# Patient Record
Sex: FEMALE | Race: WHITE
[De-identification: names, ages, dates, MRNs, and addresses within clinical notes are randomized per-mention and may not be internally consistent; named-entity substitution may affect disease eponyms.]

---

## 2017-09-14 ENCOUNTER — HOSPITAL ENCOUNTER (EMERGENCY)
Dept: HOSPITAL 10 - E/R | Age: 67
LOS: 1 days | Discharge: HOME | End: 2017-09-15
Payer: COMMERCIAL

## 2017-09-14 VITALS
WEIGHT: 189.6 LBS | HEIGHT: 60 IN | BODY MASS INDEX: 37.22 KG/M2 | WEIGHT: 189.6 LBS | BODY MASS INDEX: 37.22 KG/M2 | HEIGHT: 60 IN

## 2017-09-14 DIAGNOSIS — K76.0: ICD-10-CM

## 2017-09-14 DIAGNOSIS — N83.202: ICD-10-CM

## 2017-09-14 DIAGNOSIS — I10: ICD-10-CM

## 2017-09-14 DIAGNOSIS — D35.01: ICD-10-CM

## 2017-09-14 DIAGNOSIS — K43.2: Primary | ICD-10-CM

## 2017-09-14 PROCEDURE — 99285 EMERGENCY DEPT VISIT HI MDM: CPT

## 2017-09-14 PROCEDURE — 74177 CT ABD & PELVIS W/CONTRAST: CPT

## 2017-09-14 PROCEDURE — 81003 URINALYSIS AUTO W/O SCOPE: CPT

## 2017-09-14 PROCEDURE — 96374 THER/PROPH/DIAG INJ IV PUSH: CPT

## 2017-09-14 PROCEDURE — 85025 COMPLETE CBC W/AUTO DIFF WBC: CPT

## 2017-09-14 PROCEDURE — 36415 COLL VENOUS BLD VENIPUNCTURE: CPT

## 2017-09-14 PROCEDURE — 83690 ASSAY OF LIPASE: CPT

## 2017-09-14 PROCEDURE — 80053 COMPREHEN METABOLIC PANEL: CPT

## 2017-09-14 NOTE — ERA
ER Documentation


Chief Complaint


Date/Time


DATE: 17 


TIME: 22:53


Chief Complaint


had appendectomy in 2016, there's a bump at surgical area acc to fam





HPI


The patient is a 77-year-old female, presenting with right lower quadrant 

incisional pain where she had appendectomy in 2016 in [facility].  She 

denies fever, chills, neck pain, chest pain, dyspnea,  vomiting, dysuria, 

diarrhea.  She does not smoke nor drink





Past medical history: Hypertension, right breast lumpectomy





Past surgical history: Appendectomy





ROS


All systems reviewed and are negative except as per history of present illness.





Medications


Home Meds


Active Scripts


Ibuprofen* (Motrin*) 600 Mg Tab, 600 MG PO Q6H Y for PAIN, #20 TAB


   Prov:NORRIS PABON MD         9/15/17





Allergies


Allergies:  


Coded Allergies:  


     No Known Allergy (Unverified , 9/15/17)





Physical Exam


Vitals





Vital Signs








  Date Time  Temp Pulse Resp B/P Pulse Ox O2 Delivery O2 Flow Rate FiO2


 


9/15/17 04:26  88 19 121/60 100 Room Air  


 


9/15/17 02:15  89 19 111/58 100 Room Air  


 


17 23:03  83 19 120/66 100 Room Air  


 


17 20:23 98.4 80 19 163/73 100   








Physical Exam


 Const:      No acute distress.


 Head:        Atraumatic.


 Eyes:       Normal Conjunctiva.


 ENT:         Normal External Ears, Nose and Mouth.


 Neck:        Full range of motion.  No meningismus.


 Resp:         Clear to auscultation bilaterally.


 Cardio:       Regular rate and rhythm.


 Abd:         Soft,  non distended, normal bowel sounds, non tender. Small 

right lower quadrant incisional hernia,


 Skin:         No petechiae or rashes.


 Back:        No midline or flank tenderness.


 Ext:          No cyanosis, or edema.


 Neur:        Awake and alert. No focal deficit


 Psych:        Normal Mood and Affect.


Result Diagram:  


17





Results 24 hrs





 Laboratory Tests








Test


  17


22:46 17


22:51


 


Bedside Urine pH (LAB) 6.0  


 


Bedside Urine Protein (LAB) Negative  


 


Bedside Urine Glucose (UA) Negative  


 


Bedside Urine Ketones (LAB) Negative  


 


Bedside Urine Blood Trace-intact  


 


Bedside Urine Nitrite (LAB) Negative  


 


Bedside Urine Leukocyte


Esterase (L Negative 


  


 


 


White Blood Count  11.810^3/ul 


 


Red Blood Count  5.3110^6/ul 


 


Hemoglobin  13.7g/dl 


 


Hematocrit  42.7% 


 


Mean Corpuscular Volume  80.4fl 


 


Mean Corpuscular Hemoglobin  25.8pg 


 


Mean Corpuscular Hemoglobin


Concent 


  32.1g/dl 


 


 


Red Cell Distribution Width  14.2% 


 


Platelet Count  32494^3/UL 


 


Mean Platelet Volume  11.4fl 


 


Neutrophils %  49.5% 


 


Lymphocytes %  36.3% 


 


Monocytes %  5.6% 


 


Eosinophils %  7.7% 


 


Basophils %  0.6% 


 


Nucleated Red Blood Cells %  0.0/100WBC 


 


Neutrophils #  5.910^3/ul 


 


Lymphocytes #  4.310^3/ul 


 


Monocytes #  0.710^3/ul 


 


Eosinophils #  0.910^3/ul 


 


Basophils #  0.110^3/ul 


 


Nucleated Red Blood Cells #  0.010^3/ul 


 


Sodium Level  139mmol/L 


 


Potassium Level  3.5mmol/L 


 


Chloride Level  101mmol/L 


 


Carbon Dioxide Level  28mmol/L 


 


Anion Gap  14 


 


Blood Urea Nitrogen  17mg/dl 


 


Creatinine  0.98mg/dl 


 


Glucose Level  108mg/dl 


 


Calcium Level  10.2mg/dl 


 


Total Bilirubin  0.4mg/dl 


 


Direct Bilirubin  0.00mg/dl 


 


Indirect Bilirubin  0.4mg/dl 


 


Aspartate Amino Transf


(AST/SGOT) 


  30IU/L 


 


 


Alanine Aminotransferase


(ALT/SGPT) 


  31IU/L 


 


 


Alkaline Phosphatase  110IU/L 


 


Total Protein  8.2g/dl 


 


Albumin  4.4g/dl 


 


Globulin  3.80g/dl 


 


Albumin/Globulin Ratio  1.15 


 


Lipase  108U/L 








 Current Medications








 Medications


  (Trade)  Dose


 Ordered  Sig/Dori


 Route


 PRN Reason  Start Time


 Stop Time Status Last Admin


Dose Admin


 


 Sodium Chloride


  (NS)  100 ml @ ud  STK-MED ONCE


 .ROUTE


   9/15/17 00:59


 9/15/17 01:00 DC 9/15/17 01:01


 


 


 Iohexol


  (Omnipaque 300mg/


 ml)  150 ml  STK-MED ONCE


 .ROUTE


   9/15/17 00:59


 9/15/17 01:00 DC 9/15/17 01:00


 


 


 Ketorolac


 Tromethamine


  (Toradol)  30 mg  ONCE  STAT


 IV


   9/15/17 04:03


 9/15/17 04:04 DC 9/15/17 04:25


 











Procedures/MDM





 [place] [address]


 Radiology Main Line: [phone]





 DIAGNOSTIC IMAGING REPORT





 Patient: JON MILES   : 1950   Age: 67  Sex: F                    

    


 MR #:    [id number]   [facility] #:   [id number]    DOS: 17 2317


 Ordering MD: NORRIS PABON MD   Location:  E/R   Room/Bed:                  

                          


 








PROCEDURE:    CT ABDOMEN/PELVIS WITH CONTRAST  


 


CLINICAL INDICATION:   67-year-old female with abdominal pain. 


 


TECHNIQUE:   The study was performed utilizing a GE [employer] 64-slice CT 

scanner.  Direct axial sections were obtained through the abdomen and pelvis 

with the use of 100 cc of Omnipaque-300 nonionic intravenous contrast material. 

Sagittal and coronal reformations were obtained. One or more of the following 

dose reduction techniques were utilized: automated exposure control, adjustment 

of the mA and/or kV according to patient's size or use of iterative 

reconstruction technique.  The images were reviewed on a PACS workstation.  CTD/

vol = 21.6 mGy; Total Exam DLP = 1283 point a mGy-cm. 


 


COMPARISON:    None. 


 


FINDINGS:


 


There is trace bibasilar subsegmental atelectasis.  There is no evidence for 

significant pleural effusion.  The liver has a normal size and contour. There 

is diffuse decreased density throughout the liver consistent with fatty 

infiltration without focal areas of abnormal density or contrast enhancement. 

No intrahepatic nor extrahepatic biliary ductal dilatation is seen. The 

gallbladder demonstrates no wall thickening nor pericholecystic fluid. No 

biliary stones are evident. The pancreas is without areas of abnormal 

attenuation or contrast enhancement.  This spleen is identified and has a 

normal size without abnormal density or contrast enhancement. There is a 

nodular density within the right adrenal gland measuring approximately 17 x 10 

x 10 mm on axial image 3-54 which appears to have fluid density suggestive of 

an adenoma. There is a small left adrenal nodule measuring approximately 7 x 7 

x 7 mm on axial image 3-49. The kidneys are functional bilaterally without 

abnormal density. No 


hydroureteronephrosis nor nephroureterolithiasis is evident. The urinary 

bladder contains urine. There is a right lower quadrant lateral abdominal wall 

hernia with an opening measuring approximately 3.3 x 5.4 cm containing loops of 

small bowel but without transition point to suggest obstruction. The appendix 

is not visualized presumably from prior appendectomy. The uterus is atrophic. 

There is a left ovarian cyst measuring approximately 1.9 x 2.8 x 1.9 cm. There 

is a small calcific density within the left ovary. There is no significant free 

fluid.  The aortoiliac vessels are diffusely calcified but without aneurysmal 

dilatation. Degenerative changes are seen throughout the spine.


 


IMPRESSION:


 


1.  Hepatic steatosis.


2.  Probable right adrenal 17 x 10 mm adenoma with smaller left adrenal 7 x 7 

mm nodule not well characterized.   


3.  Right lower quadrant lateral abdominal wall hernia containing loops of 

bowel without evidence for obstruction.


4.  Left ovarian cyst.


5.  Vascular calcifications.


6.  Degenerative changes within the spine.


_____________________________________________ 


.Casimiro Maynard MD, MD           Date    Time 


Electronically viewed and signed by .Casimiro Maynard MD, MD on 09/15/2017 01:33 


 


D:  09/15/2017 01:33  T:  09/15/2017 01:33


.M/





CC: NORRIS PABON MD





MEDICAL MAKING DECISION: The patient is a 67-year-old female, presenting with 

incisional hernia. She was treated with Toradol 30 mg IV for pain with good 

response. She is stable for outpatient follow-up





The differential diagnoses considered include but are not limited to 

cholelithiasis, cholecystitis, cystitis, pancreatitis, hepatitis, gastritis, 

peptic ulcer disease, gastric ulcer, appendicitis, diverticulitis, cholangitis, 

choledocholithiasis, partial small bowel obstruction.





Departure


Diagnosis:  


 Primary Impression:  


 Incisional hernia


 Additional Impressions:  


 Adrenal adenoma


 Hepatic steatosis


 Left ovarian cyst


Condition:  Good


Comments


She was discharged with Motrin





I discussed the findings with the patient. I advised the patient to follow-up 

with the primary physician for referral to see a general surgeon in about 1-2 

days, sooner if needed and return if any concern.











NORRIS PABON MD Sep 14, 2017 22:54

## 2017-09-15 VITALS — HEART RATE: 88 BPM | RESPIRATION RATE: 19 BRPM | DIASTOLIC BLOOD PRESSURE: 60 MMHG | SYSTOLIC BLOOD PRESSURE: 121 MMHG

## 2017-09-15 LAB
ALBUMIN SERPL-MCNC: 4.4 G/DL (ref 3.3–4.9)
ALBUMIN/GLOB SERPL: 1.15 {RATIO}
ALP SERPL-CCNC: 110 IU/L (ref 42–121)
ALT SERPL-CCNC: 31 IU/L (ref 13–69)
ANION GAP SERPL CALC-SCNC: 14 MMOL/L (ref 8–16)
AST SERPL-CCNC: 30 IU/L (ref 15–46)
BASOPHILS # BLD AUTO: 0.1 10^3/UL (ref 0–0.1)
BASOPHILS NFR BLD: 0.6 % (ref 0–2)
BILIRUB DIRECT SERPL-MCNC: 0 MG/DL (ref 0–0.2)
BILIRUB SERPL-MCNC: 0.4 MG/DL (ref 0.2–1.3)
BUN SERPL-MCNC: 17 MG/DL (ref 7–20)
CALCIUM SERPL-MCNC: 10.2 MG/DL (ref 8.4–10.2)
CHLORIDE SERPL-SCNC: 101 MMOL/L (ref 97–110)
CO2 SERPL-SCNC: 28 MMOL/L (ref 21–31)
CREAT SERPL-MCNC: 0.98 MG/DL (ref 0.44–1)
EOSINOPHIL # BLD: 0.9 10^3/UL (ref 0–0.5)
EOSINOPHIL NFR BLD: 7.7 % (ref 0–7)
ERYTHROCYTE [DISTWIDTH] IN BLOOD BY AUTOMATED COUNT: 14.2 % (ref 11.5–14.5)
GLOBULIN SER-MCNC: 3.8 G/DL (ref 1.3–3.2)
GLUCOSE SERPL-MCNC: 108 MG/DL (ref 70–220)
HCT VFR BLD CALC: 42.7 % (ref 37–47)
HGB BLD-MCNC: 13.7 G/DL (ref 12–16)
LYMPHOCYTES # BLD AUTO: 4.3 10^3/UL (ref 0.8–2.9)
LYMPHOCYTES NFR BLD AUTO: 36.3 % (ref 15–51)
MCH RBC QN AUTO: 25.8 PG (ref 29–33)
MCHC RBC AUTO-ENTMCNC: 32.1 G/DL (ref 32–37)
MCV RBC AUTO: 80.4 FL (ref 82–101)
MONOCYTES # BLD: 0.7 10^3/UL (ref 0.3–0.9)
MONOCYTES NFR BLD: 5.6 % (ref 0–11)
NEUTROPHILS # BLD: 5.9 10^3/UL (ref 1.6–7.5)
NEUTROPHILS NFR BLD AUTO: 49.5 % (ref 39–77)
NRBC # BLD MANUAL: 0 10^3/UL (ref 0–0)
NRBC BLD AUTO-RTO: 0 /100WBC (ref 0–0)
PLATELET # BLD: 429 10^3/UL (ref 140–415)
PMV BLD AUTO: 11.4 FL (ref 7.4–10.4)
POTASSIUM SERPL-SCNC: 3.5 MMOL/L (ref 3.5–5.1)
PROT SERPL-MCNC: 8.2 G/DL (ref 6.1–8.1)
RBC # BLD AUTO: 5.31 10^6/UL (ref 4.2–5.4)
SODIUM SERPL-SCNC: 139 MMOL/L (ref 135–144)
WBC # BLD AUTO: 11.8 10^3/UL (ref 4.8–10.8)

## 2017-09-15 NOTE — RADRPT
PROCEDURE:    CT ABDOMEN/PELVIS WITH CONTRAST  

 

CLINICAL INDICATION:   67-year-old female with abdominal pain. 

 

TECHNIQUE:   The study was performed utilizing a [employer] VCT 64-slice CT scanner.  Direct axia
l sections were obtained through the abdomen and pelvis with the use of 100 cc of Omnipaque-300 lina
onic intravenous contrast material. Sagittal and coronal reformations were obtained. One or more of 
the following dose reduction techniques were utilized: automated exposure control, adjustment of the
 mA and/or kV according to patient's size or use of iterative reconstruction technique.  The images 
were reviewed on a PACS workstation.  CTD/vol = 21.6 mGy; Total Exam DLP = 1283 point a mGy-cm. 

 

COMPARISON:    None. 

 

FINDINGS:

 

There is trace bibasilar subsegmental atelectasis.  There is no evidence for significant pleural eff
usion.  The liver has a normal size and contour. There is diffuse decreased density throughout the l
iver consistent with fatty infiltration without focal areas of abnormal density or contrast enhancem
ent. No intrahepatic nor extrahepatic biliary ductal dilatation is seen. The gallbladder demonstrate
s no wall thickening nor pericholecystic fluid. No biliary stones are evident. The pancreas is witho
ut areas of abnormal attenuation or contrast enhancement.  This spleen is identified and has a trixie
l size without abnormal density or contrast enhancement. There is a nodular density within the right
 adrenal gland measuring approximately 17 x 10 x 10 mm on axial image 3-54 which appears to have flu
id density suggestive of an adenoma. There is a small left adrenal nodule measuring approximately 7 
x 7 x 7 mm on axial image 3-49. The kidneys are functional bilaterally without abnormal density. No 

hydroureteronephrosis nor nephroureterolithiasis is evident. The urinary bladder contains urine. The
re is a right lower quadrant lateral abdominal wall hernia with an opening measuring approximately 3
.3 x 5.4 cm containing loops of small bowel but without transition point to suggest obstruction. The
 appendix is not visualized presumably from prior appendectomy. The uterus is atrophic. There is a l
eft ovarian cyst measuring approximately 1.9 x 2.8 x 1.9 cm. There is a small calcific density withi
n the left ovary. There is no significant free fluid.  The aortoiliac vessels are diffusely calcifie
d but without aneurysmal dilatation. Degenerative changes are seen throughout the spine.

 

IMPRESSION:

 

1.  Hepatic steatosis.

2.  Probable right adrenal 17 x 10 mm adenoma with smaller left adrenal 7 x 7 mm nodule not well humera
racterized.   

3.  Right lower quadrant lateral abdominal wall hernia containing loops of bowel without evidence fo
r obstruction.

4.  Left ovarian cyst.

5.  Vascular calcifications.

6.  Degenerative changes within the spine.

_____________________________________________ 

.Casimiro Maynard MD, MD           Date    Time 

Electronically viewed and signed by .Casimiro Maynard MD, MD on 09/15/2017 01:33 

 

D:  09/15/2017 01:33  T:  09/15/2017 01:33

.HILDA/

## 2018-05-25 ENCOUNTER — HOSPITAL ENCOUNTER (OUTPATIENT)
Dept: HOSPITAL 91 - SDS | Age: 68
Setting detail: OBSERVATION
LOS: 1 days | Discharge: HOME | End: 2018-05-26
Payer: COMMERCIAL

## 2018-05-25 ENCOUNTER — HOSPITAL ENCOUNTER (OUTPATIENT)
Age: 68
Setting detail: OBSERVATION
LOS: 1 days | Discharge: HOME | End: 2018-05-26

## 2018-05-25 DIAGNOSIS — K43.0: Primary | ICD-10-CM

## 2018-05-25 DIAGNOSIS — I10: ICD-10-CM

## 2018-05-25 DIAGNOSIS — E11.9: ICD-10-CM

## 2018-05-25 DIAGNOSIS — E78.5: ICD-10-CM

## 2018-05-25 DIAGNOSIS — E66.9: ICD-10-CM

## 2018-05-25 LAB
ADD MAN DIFF?: NO
ALANINE AMINOTRANSFERASE: 25 IU/L (ref 13–69)
ALBUMIN/GLOBULIN RATIO: 1.13
ALBUMIN: 4.1 G/DL (ref 3.3–4.9)
ALKALINE PHOSPHATASE: 95 IU/L (ref 42–121)
ANION GAP: 14 (ref 8–16)
ASPARTATE AMINO TRANSFERASE: 21 IU/L (ref 15–46)
BASOPHIL #: 0.1 10^3/UL (ref 0–0.1)
BASOPHILS %: 0.8 % (ref 0–2)
BILIRUBIN,DIRECT: 0 MG/DL (ref 0–0.2)
BILIRUBIN,TOTAL: 0.4 MG/DL (ref 0.2–1.3)
BLOOD UREA NITROGEN: 14 MG/DL (ref 7–20)
CALCIUM: 9.6 MG/DL (ref 8.4–10.2)
CARBON DIOXIDE: 28 MMOL/L (ref 21–31)
CHLORIDE: 108 MMOL/L (ref 97–110)
CREATININE: 0.72 MG/DL (ref 0.44–1)
EOSINOPHILS #: 0.6 10^3/UL (ref 0–0.5)
EOSINOPHILS %: 6.5 % (ref 0–7)
GLOBULIN: 3.6 G/DL (ref 1.3–3.2)
GLUCOSE: 115 MG/DL (ref 70–220)
HEMATOCRIT: 39.2 % (ref 37–47)
HEMOGLOBIN: 12.8 G/DL (ref 12–16)
INR: 0.86
LYMPHOCYTES #: 2.9 10^3/UL (ref 0.8–2.9)
LYMPHOCYTES %: 31 % (ref 15–51)
MEAN CORPUSCULAR HEMOGLOBIN: 26 PG (ref 29–33)
MEAN CORPUSCULAR HGB CONC: 32.7 G/DL (ref 32–37)
MEAN CORPUSCULAR VOLUME: 79.5 FL (ref 82–101)
MEAN PLATELET VOLUME: 10.7 FL (ref 7.4–10.4)
MONOCYTE #: 0.5 10^3/UL (ref 0.3–0.9)
MONOCYTES %: 5.4 % (ref 0–11)
NEUTROPHIL #: 5.1 10^3/UL (ref 1.6–7.5)
NEUTROPHILS %: 56 % (ref 39–77)
NUCLEATED RED BLOOD CELLS #: 0 10^3/UL (ref 0–0)
NUCLEATED RED BLOOD CELLS%: 0 /100WBC (ref 0–0)
PARTIAL THROMBOPLASTIN TIME: 28.8 SEC (ref 25–35)
PLATELET COUNT: 329 10^3/UL (ref 140–415)
POTASSIUM: 4.2 MMOL/L (ref 3.5–5.1)
PROTIME: 11.8 SEC (ref 11.9–14.9)
PT RATIO: 0.9
RED BLOOD COUNT: 4.93 10^6/UL (ref 4.2–5.4)
RED CELL DISTRIBUTION WIDTH: 14 % (ref 11.5–14.5)
SODIUM: 146 MMOL/L (ref 135–144)
TOTAL PROTEIN: 7.7 G/DL (ref 6.1–8.1)
WHITE BLOOD COUNT: 9.2 10^3/UL (ref 4.8–10.8)

## 2018-05-25 PROCEDURE — 85610 PROTHROMBIN TIME: CPT

## 2018-05-25 PROCEDURE — 85025 COMPLETE CBC W/AUTO DIFF WBC: CPT

## 2018-05-25 PROCEDURE — 82962 GLUCOSE BLOOD TEST: CPT

## 2018-05-25 PROCEDURE — 85730 THROMBOPLASTIN TIME PARTIAL: CPT

## 2018-05-25 PROCEDURE — 49655: CPT

## 2018-05-25 PROCEDURE — 80053 COMPREHEN METABOLIC PANEL: CPT

## 2018-05-25 RX ADMIN — THIAMINE HYDROCHLORIDE 1 MLS/HR: 100 INJECTION, SOLUTION INTRAMUSCULAR; INTRAVENOUS at 07:00

## 2018-05-25 RX ADMIN — CEFAZOLIN SODIUM 1 MLS/HR: 2 SOLUTION INTRAVENOUS at 07:00

## 2018-05-25 RX ADMIN — MEPERIDINE HYDROCHLORIDE 1 MG: 25 INJECTION, SOLUTION INTRAMUSCULAR; INTRAVENOUS; SUBCUTANEOUS at 13:13

## 2018-05-25 RX ADMIN — BUPIVACAINE HYDROCHLORIDE 1 ML: 2.5 INJECTION, SOLUTION EPIDURAL; INFILTRATION; INTRACAUDAL; PERINEURAL at 00:00

## 2018-05-25 RX ADMIN — ONDANSETRON HYDROCHLORIDE 1 MG: 2 INJECTION, SOLUTION INTRAMUSCULAR; INTRAVENOUS at 13:14

## 2018-05-25 RX ADMIN — BACITRACIN 1 ML: 50000 INJECTION, POWDER, FOR SOLUTION INTRAMUSCULAR at 00:00

## 2018-05-25 RX ADMIN — HYDROCODONE BITARTRATE AND ACETAMINOPHEN 1 TAB: 5; 325 TABLET ORAL at 14:52

## 2018-05-25 RX ADMIN — MORPHINE SULFATE 1 MG: 2 INJECTION, SOLUTION INTRAMUSCULAR; INTRAVENOUS at 18:57

## 2018-05-25 RX ADMIN — HYDRALAZINE HYDROCHLORIDE 1 MG: 20 INJECTION INTRAMUSCULAR; INTRAVENOUS at 13:13

## 2019-09-17 ENCOUNTER — HOSPITAL ENCOUNTER (EMERGENCY)
Dept: HOSPITAL 10 - E/R | Age: 69
Discharge: HOME | End: 2019-09-17
Payer: COMMERCIAL

## 2019-09-17 ENCOUNTER — HOSPITAL ENCOUNTER (EMERGENCY)
Dept: HOSPITAL 91 - E/R | Age: 69
Discharge: HOME | End: 2019-09-17
Payer: COMMERCIAL

## 2019-09-17 VITALS
BODY MASS INDEX: 41.4 KG/M2 | HEIGHT: 64 IN | HEIGHT: 64 IN | WEIGHT: 242.51 LBS | WEIGHT: 242.51 LBS | BODY MASS INDEX: 41.4 KG/M2

## 2019-09-17 VITALS — RESPIRATION RATE: 17 BRPM | HEART RATE: 76 BPM | SYSTOLIC BLOOD PRESSURE: 128 MMHG | DIASTOLIC BLOOD PRESSURE: 65 MMHG

## 2019-09-17 DIAGNOSIS — R53.1: Primary | ICD-10-CM

## 2019-09-17 DIAGNOSIS — R11.2: ICD-10-CM

## 2019-09-17 LAB
ADD MAN DIFF?: NO
ANION GAP: 7 (ref 5–13)
BASOPHIL #: 0.1 10^3/UL (ref 0–0.1)
BASOPHILS %: 0.8 % (ref 0–2)
BLOOD UREA NITROGEN: 13 MG/DL (ref 7–20)
CALCIUM: 10.1 MG/DL (ref 8.4–10.2)
CARBON DIOXIDE: 24 MMOL/L (ref 21–31)
CHLORIDE: 111 MMOL/L (ref 97–110)
CREATININE: 0.91 MG/DL (ref 0.44–1)
EOSINOPHILS #: 0.7 10^3/UL (ref 0–0.5)
EOSINOPHILS %: 7.1 % (ref 0–7)
GLUCOSE: 119 MG/DL (ref 70–220)
HEMATOCRIT: 45.9 % (ref 37–47)
HEMOGLOBIN: 14.4 G/DL (ref 12–16)
IMMATURE GRANS #M: 0.02 10^3/UL (ref 0–0.03)
IMMATURE GRANS % (M): 0.2 % (ref 0–0.43)
LYMPHOCYTES #: 2.9 10^3/UL (ref 0.8–2.9)
LYMPHOCYTES %: 31.4 % (ref 15–51)
MEAN CORPUSCULAR HEMOGLOBIN: 26 PG (ref 29–33)
MEAN CORPUSCULAR HGB CONC: 31.4 G/DL (ref 32–37)
MEAN CORPUSCULAR VOLUME: 83 FL (ref 82–101)
MEAN PLATELET VOLUME: 10.6 FL (ref 7.4–10.4)
MONOCYTE #: 0.4 10^3/UL (ref 0.3–0.9)
MONOCYTES %: 4.5 % (ref 0–11)
NEUTROPHIL #: 5.2 10^3/UL (ref 1.6–7.5)
NEUTROPHILS %: 56 % (ref 39–77)
NUCLEATED RED BLOOD CELLS #: 0 10^3/UL (ref 0–0)
NUCLEATED RED BLOOD CELLS%: 0 /100WBC (ref 0–0)
PLATELET COUNT: 357 10^3/UL (ref 140–415)
POTASSIUM: 4 MMOL/L (ref 3.5–5.1)
RED BLOOD COUNT: 5.53 10^6/UL (ref 4.2–5.4)
RED CELL DISTRIBUTION WIDTH: 14.4 % (ref 11.5–14.5)
SODIUM: 142 MMOL/L (ref 135–144)
TROPONIN-I: < 0.012 NG/ML (ref 0–0.12)
WHITE BLOOD COUNT: 9.3 10^3/UL (ref 4.8–10.8)

## 2019-09-17 PROCEDURE — 84484 ASSAY OF TROPONIN QUANT: CPT

## 2019-09-17 PROCEDURE — 36415 COLL VENOUS BLD VENIPUNCTURE: CPT

## 2019-09-17 PROCEDURE — 85025 COMPLETE CBC W/AUTO DIFF WBC: CPT

## 2019-09-17 PROCEDURE — 96374 THER/PROPH/DIAG INJ IV PUSH: CPT

## 2019-09-17 PROCEDURE — 80048 BASIC METABOLIC PNL TOTAL CA: CPT

## 2019-09-17 PROCEDURE — 99284 EMERGENCY DEPT VISIT MOD MDM: CPT

## 2019-09-17 PROCEDURE — 93005 ELECTROCARDIOGRAM TRACING: CPT

## 2019-09-17 RX ADMIN — ONDANSETRON HYDROCHLORIDE 1 MG: 2 INJECTION, SOLUTION INTRAMUSCULAR; INTRAVENOUS at 12:27

## 2019-09-17 RX ADMIN — THIAMINE HYDROCHLORIDE 1 MLS/HR: 100 INJECTION, SOLUTION INTRAMUSCULAR; INTRAVENOUS at 12:27
